# Patient Record
Sex: FEMALE | Race: WHITE
[De-identification: names, ages, dates, MRNs, and addresses within clinical notes are randomized per-mention and may not be internally consistent; named-entity substitution may affect disease eponyms.]

---

## 2020-07-12 ENCOUNTER — HOSPITAL ENCOUNTER (EMERGENCY)
Dept: HOSPITAL 76 - ED | Age: 25
Discharge: HOME | End: 2020-07-12
Payer: COMMERCIAL

## 2020-07-12 VITALS — SYSTOLIC BLOOD PRESSURE: 115 MMHG | DIASTOLIC BLOOD PRESSURE: 71 MMHG

## 2020-07-12 DIAGNOSIS — G43.909: Primary | ICD-10-CM

## 2020-07-12 LAB
ANION GAP SERPL CALCULATED.4IONS-SCNC: 10 MMOL/L (ref 6–13)
BASOPHILS NFR BLD AUTO: 0 10^3/UL (ref 0–0.1)
BASOPHILS NFR BLD AUTO: 0.4 %
BUN SERPL-MCNC: 12 MG/DL (ref 6–20)
CALCIUM UR-MCNC: 9.7 MG/DL (ref 8.5–10.3)
CHLORIDE SERPL-SCNC: 99 MMOL/L (ref 101–111)
CO2 SERPL-SCNC: 29 MMOL/L (ref 21–32)
CREAT SERPLBLD-SCNC: 0.8 MG/DL (ref 0.4–1)
EOSINOPHIL # BLD AUTO: 0.1 10^3/UL (ref 0–0.7)
EOSINOPHIL NFR BLD AUTO: 1.4 %
ERYTHROCYTE [DISTWIDTH] IN BLOOD BY AUTOMATED COUNT: 11.6 % (ref 12–15)
GLUCOSE SERPL-MCNC: 99 MG/DL (ref 70–100)
HGB UR QL STRIP: 14.8 G/DL (ref 12–16)
LYMPHOCYTES # SPEC AUTO: 2.4 10^3/UL (ref 1.5–3.5)
LYMPHOCYTES NFR BLD AUTO: 23.7 %
MCH RBC QN AUTO: 31.7 PG (ref 27–31)
MCHC RBC AUTO-ENTMCNC: 34.9 G/DL (ref 32–36)
MCV RBC AUTO: 90.8 FL (ref 81–99)
MONOCYTES # BLD AUTO: 0.7 10^3/UL (ref 0–1)
MONOCYTES NFR BLD AUTO: 6.8 %
NEUTROPHILS # BLD AUTO: 6.9 10^3/UL (ref 1.5–6.6)
NEUTROPHILS # SNV AUTO: 10.3 X10^3/UL (ref 4.8–10.8)
NEUTROPHILS NFR BLD AUTO: 67.2 %
PDW BLD AUTO: 10.7 FL (ref 7.9–10.8)
PLATELET # BLD: 263 10^3/UL (ref 130–450)
RBC MAR: 4.67 10^6/UL (ref 4.2–5.4)
SODIUM SERPLBLD-SCNC: 138 MMOL/L (ref 135–145)

## 2020-07-12 PROCEDURE — 99284 EMERGENCY DEPT VISIT MOD MDM: CPT

## 2020-07-12 PROCEDURE — 80048 BASIC METABOLIC PNL TOTAL CA: CPT

## 2020-07-12 PROCEDURE — 96375 TX/PRO/DX INJ NEW DRUG ADDON: CPT

## 2020-07-12 PROCEDURE — 36415 COLL VENOUS BLD VENIPUNCTURE: CPT

## 2020-07-12 PROCEDURE — 85025 COMPLETE CBC W/AUTO DIFF WBC: CPT

## 2020-07-12 PROCEDURE — 96374 THER/PROPH/DIAG INJ IV PUSH: CPT

## 2020-07-12 PROCEDURE — 96372 THER/PROPH/DIAG INJ SC/IM: CPT

## 2020-07-12 NOTE — ED PHYSICIAN DOCUMENTATION
History of Present Illness





- Stated complaint


Stated Complaint: MIGRAINE/NAUSEA





- Chief complaint


Chief Complaint: Neuro





- History obtained from


History obtained from: Patient





- History of Present Illness


Timing: Today


Pain level max: 8


Pain level now: 8





- Additonal information


Additional information: 





24-year-old female presents to the emergency department stating that she has had

a migraine headache since this morning.  This occurs approximately every 4 to 6 

months.  She used to be on Maxalt, but does not currently have any.  Tried 

Tylenol and Motrin this morning without relief.  She has had some nausea but no 

vomiting.  Worse with light and sound, better lying in a dark room.,No fevers.  

No trauma.  No focal neurological deficits.  No numbness or tingling.





Review of Systems


Ten Systems: 10 systems reviewed and negative


Constitutional: denies: Fever, Chills


Eyes: reports: Photophobia


Ears: denies: Ear pain


Nose: denies: Rhinorrhea / runny nose, Congestion


Throat: denies: Sore throat


Cardiac: denies: Chest pain / pressure


Respiratory: denies: Cough


GI: reports: Nausea.  denies: Vomiting


: reports: Other (Denies any possibility of pregnancy).  denies: Dysuria, 

Frequency, Hesitancy, Now pregnant EGA


Skin: denies: Rash


Musculoskeletal: denies: Neck pain, Back pain


Neurologic: denies: Seizure, Confused, Altered mental status, Head injury, LOC





PD PAST MEDICAL HISTORY





- Past Medical History


Past Medical History: Yes


Neuro: Migraines





- Present Medications


Home Medications: 


                                Ambulatory Orders











 Medication  Instructions  Recorded  Confirmed


 


Rizatriptan Benzoate [Maxalt] 10 mg PO DAILY PRN #10 tablet 07/12/20 














- Allergies


Allergies/Adverse Reactions: 


                                    Allergies











Allergy/AdvReac Type Severity Reaction Status Date / Time


 


No Known Drug Allergies Allergy   Verified 07/12/20 14:38














- Living Situation


Living Situation: reports: With family


Living Arrangement: reports: At home





- Social History


Does the pt have substance abuse?: No





- Family History


Family history: reports: Non contributory





- Immunizations


Immunizations are current?: Yes





PD ED PE NORMAL





- Vitals


Vital signs reviewed: Yes





- General


General: Alert and oriented X 3, No acute distress





- HEENT


HEENT: Moist mucous membranes





- Neck


Neck: Supple, no meningeal sign





- Cardiac


Cardiac: RRR





- Respiratory


Respiratory: No respiratory distress, Clear bilaterally





- Abdomen


Abdomen: Soft, Non tender, Non distended





- Derm


Derm: Warm and dry





- Neuro


Neuro: Alert and oriented X 3, CNs 2-12 intact, No motor deficit, No sensory 

deficit, Normal speech


Eye Opening: Spontaneous


Motor: Obeys Commands


Verbal: Oriented


GCS Score: 15





- Psych


Psych: Normal mood, Normal affect





Results





- Vitals


Vitals: 


                               Vital Signs - 24 hr











  07/12/20





  13:12


 


Temperature 37.1 C


 


Heart Rate 83


 


Respiratory 20





Rate 


 


Blood Pressure 125/75


 


O2 Saturation 100








                                     Oxygen











O2 Source                      Room air

















- Labs


Labs: 


                                Laboratory Tests











  07/12/20 07/12/20





  13:38 13:38


 


WBC  10.3 


 


RBC  4.67 


 


Hgb  14.8 


 


Hct  42.4 


 


MCV  90.8 


 


MCH  31.7 H 


 


MCHC  34.9 


 


RDW  11.6 L 


 


Plt Count  263 


 


MPV  10.7 


 


Neut # (Auto)  6.9 H 


 


Lymph # (Auto)  2.4 


 


Mono # (Auto)  0.7 


 


Eos # (Auto)  0.1 


 


Baso # (Auto)  0.0 


 


Absolute Nucleated RBC  0.00 


 


Nucleated RBC %  0.0 


 


Sodium   138


 


Potassium   3.5


 


Chloride   99 L


 


Carbon Dioxide   29


 


Anion Gap   10.0


 


BUN   12


 


Creatinine   0.8


 


Estimated GFR (MDRD)   89


 


Glucose   99


 


Calcium   9.7














PD MEDICAL DECISION MAKING





- ED course


Complexity details: reviewed results, re-evaluated patient, considered 

differential, d/w patient


ED course: 





24-year-old female with her usual migraine headache.  Given Imitrex, Phenergan, 

Benadryl, saline.  Feels much better.  Will prescribe Maxalt for home.  She is 

well-appearing, nontoxic.  Afebrile.  Patient counseled regarding signs and sym

ptoms for which I believe and urgent re-evaluation would be necessary. Patient 

with good understanding of and agreement to plan and is comfortable going home 

at this time





This document was made in part using voice recognition software. While efforts 

are made to proofread this document, sound alike and grammatical errors may 

occur.





No subarachnoid hemorrhage.  No tumor, no mass.  No neurological deficits





Departure





- Departure


Disposition: 01 Home, Self Care


Clinical Impression: 


Migraine


Qualifiers:


 Migraine type: unspecified Status migrainosus presence: without status 

migrainosus Intractability: not intractable Qualified Code(s): G43.909 - 

Migraine, unspecified, not intractable, without status migrainosus





Condition: Good


Instructions:  ED Headache Migraine


Follow-Up: 


Scotty Saavedra MD [Primary Care Provider] - Within 1 week


Prescriptions: 


Rizatriptan Benzoate [Maxalt] 10 mg PO DAILY PRN #10 tablet


 PRN Reason: Migraine


Comments: 


Return if you worsen.  I will prescribe your Maxalt for you today.  Go home and 

rest.  You are not to drive or operate heavy machinery for at least the next 8 

hours.